# Patient Record
Sex: FEMALE | URBAN - METROPOLITAN AREA
[De-identification: names, ages, dates, MRNs, and addresses within clinical notes are randomized per-mention and may not be internally consistent; named-entity substitution may affect disease eponyms.]

---

## 2017-01-11 ENCOUNTER — VIRTUAL VISIT (OUTPATIENT)
Dept: FAMILY MEDICINE | Facility: OTHER | Age: 32
End: 2017-01-11

## 2017-01-11 NOTE — PROGRESS NOTES
"Date:   Clinician: Kortney Bairllas  Clinician NPI: [id number]  Patient: Rachel Fox  Patient : 1985  Patient Address: [address] [place] [address]  Patient Phone: [phone]  Visit Protocol: URI  Patient Summary:  Rachel is a 31 year old ( : 1985 ) female who initiated a Zip for a presumed sinus infection. When asked the question \"Do you have a [place] primary care physician?\", Rachel responded \"No\".     Her  symptoms started suddenly 8-9 days ago  and consist of post-nasal drainage, cough, myalgias, malaise, rhinitis, sore throat, hoarse voice, nasal congestion, and ear pain.   She denies fever, chest pain, dysphagia, nausea, vomiting, itchy eyes, chills, petechial or purpuric rash, loss of appetite, and dyspnea. She denies a history of facial surgery.   Her moderate nasal secretions are yellow, blood tinged, and clear. Her mild facial pain or pressure feels worse when bending over or leaning forward and is located on both sides of her head. The facial pain or pressure started after the onset of other URI symptoms.  Rachel has a moderate headache. The headache did not start before her other symptoms and is located on both sides of her head.   She has a mildly painful sore throat. The patient denies having white spots on the tonsils similar to a sample strep throat image provided. She might have been exposed to Strep. When asked to feel her neck she reported enlarged lymph nodes. Rachel noted that the enlarged neck lymph nodes developed AFTER the onset of upper respiratory symptoms. She denies axillary lymphadenopathy.   Regarding the ear pain, the patient denies experiencing pain when gently pulling on the earlobe, tinnitus, and recent injury to the area around the ear.   She reports having mild ear pain on the ear canal area of both ears for 8-10 days. The patient hears normally despite the ear pain.   Rachel reports having a feeling of fullness in the ear as " if it is clogged on the following locations: Ear Canal and Mastoid process.   Rachel denies having redness, swelling, and tenderness on her ear.   Additionally, she does not experience pain when bending the chin to the chest, finds the pain is worse while eating or chewing, and finds the pain worsens if the mouth is open fully or the teeth are clenched.   She has never had tympanostomy tube placement.    Her moderately severe (cough every 5-10 minutes) productive cough is more bothersome at night. She believes the cough is caused by post-nasal drainage. Her cough produces yellow sputum.   Her highest temperature was 99.0 degrees Fahrenheit. Her current temperature is 98.8 degrees Fahrenheit. Rachel has had a fever for 1 - 2 days and used the oral method for measuring her temperature.   She has passed urine in the past 12 hours.   aRchel denies having COPD or other chronic lung disease.   Pulse: self-reported pulse rate as: 9 beats in 10 seconds.   Current Temperature (F): 98.8     Weight (in lbs): 160   She states she is not pregnant and denies breastfeeding. She has menstruated in the past month.   Rachel smokes or uses smokeless tobacco.   MEDICATIONS:  Venlafaxine (Effexor), loratadine (Claritin), and fluticasone (Flonase)   Patient free text response:  Buproprion   , ALLERGIES:  NKDA   Clinician Response:  Dear Rachel,  Based on the information you have provided, you likely have  acute sinusitis, otherwise known as a sinus infection.   I am prescribing amoxicillin-Pot Clavulanate [Augmentin] 875-125mg. Take one tablet by mouth two times a day for 10 days. There are no refills with this prescription.   Sinus pressure occurs when the tissues lining your sinuses become swollen and inflamed. Afrin nasal spray decreases the swelling to provide the quickest and most effective relief from sinus pressure.  Use oxymetazoline (Afrin, or store brand) nasal spray. Spray once in each nostril twice per day for a  maximum of 3 days. Using this medication more frequently or longer than recommended may cause nasal congestion to reoccur or worsen. This is an over-the-counter medication you can find at most pharmacies.   Unless your are allergic to the over-the-counter medication(s) below, I recommend using:   Saline nasal spray (such as Ocean or store brand). Use 1-2 sprays in each nostril 3 times a day as needed for congestion. This is an over-the-counter medication that does not require a prescription.   Guaifenesin + dextromethorphan (Robitussin DM, Mucinex DM). This is an over-the-counter medication that does not require a prescription.   A decongestant such as pseudoephedrine (Sudafed or store brand) to help your symptoms. This is an over-the-counter medication that does not require a prescription.   Acetaminophen (Tylenol), which helps to reduce your discomfort and fever. Take 1-2 pills every 4-6 hours. This is an over-the-counter medication that does not require a prescription.   Ibuprofen. Take 1-3 200mg tablets (200-600mg) every 8 hours to help with the discomfort. Make sure to take the ibuprofen with food. Do not exceed 2400mg in 24 hours. This is an over-the-counter medication that does not require a prescription.   Try the following to help with your throat pain and discomfort:     Use throat lozenges    Gargle with warm salt water (1/4 teaspoon of salt per 8 ounce glass of water).    Suck on frozen items such as Popsicles or ice cubes.     Call 911 or go to the emergency room if you feel that your throat is closing off, you suddenly develop a rash, you are unable to swallow fluids, you are drooling, or you are having difficulty breathing.  Follow up with your primary care clinician if your symptoms are not improving in 3-4 days.   Mild ear pain or pressure is common when you have an upper respiratory infection. The pain is caused by fluid and inflammation in your sinus passages. If your ear pain persists more than 3  days or if you notice drainage from your ears, please be seen in a clinic to get your ears examined.   Drink plenty of liquids, especially water and take time to rest your body. This may mean taking a nap or going to bed earlier. Your body is fighting an infection and liquids and rest will improve the pace of recovery. Remember to regularly wash your hands and avoid close contact with others to prevent spreading your infection.   Some people develop allergies to antibiotics. If you notice a new rash, significant swelling or difficulty breathing, stop the medication immediately and go into a clinic for physical evaluation.   Some women may also develop a yeast infection as a side effect of taking antibiotics. If you notice symptoms of a yeast infection, please use Zipnosis to get treatment.   If you become pregnant during this course of treatment with medication, stop taking the medication and contact your primary care clinician.   Finally, as your clinician, I need you to know that becoming tobacco-free is the most important thing you can do to protect your current and future health.   Diagnosis: Acute Sinusitis  Diagnosis ICD: J01.90  Prescription: amoxicillin-Pot Clavulanate (Augmentin) 875-125 mg oral tablet 20 tablets, 10 days supply. Take one tablet by mouth two times a day for 10 days. Refills: 0, Refill as needed: no, Allow substitutions: yes  Prescription Sent At: January 11 04:22:56, 2017  Pharmacy: [facility]/pharmacy #[id number] - [phone] - [address] [place] [address]

## 2017-02-11 ENCOUNTER — VIRTUAL VISIT (OUTPATIENT)
Dept: FAMILY MEDICINE | Facility: OTHER | Age: 32
End: 2017-02-11

## 2017-02-11 NOTE — PROGRESS NOTES
Date:   Clinician: Flori Ramirez  Clinician NPI: [id number]  Patient: Rachel Fox  Patient : 1985  Patient Address: [address] [place] [address]  Patient Phone: [phone]  Visit Protocol: Yeast Infection  Patient Summary:  Rachel is a 31 year old ( : 1985 ) female who initiated a Zip for a presumed vaginal yeast infection.     0-5 days ago she began noticing vaginal discharge, vaginal pruritus, and perivulvar pruritus .   She denies having perivulvar rash, fever, and abdominal pain or lesions.  She has had one (1) occurrence in the past year and the current symptoms are similar to previous yeast infections. She has not tried to treat her current symptoms with any medication.   She has a more than normal amount of chunky (like cottage cheese), clear or white, thick, malodorous discharge.   She is currently taking or has taken antibiotics in the past 2 weeks. She prefers a fluconazole (Diflucan) Pill.   She states she is not pregnant and denies breastfeeding. She has menstruated in the past month.   She denies risk factors for sexually transmitted infections. She smokes or uses smokeless tobacco.  Additional information as reported by the patient (free text): I usually need 2 Diflucan doses instead of just 1.  Thanks!    MEDICATIONS:   Venlafaxine (Effexor)   Patient free text response: Buproprion    , ALLERGIES:  NKDA   Clinician Response:  Dear Rachel,  Based on the information you have provided, you likely have a vaginal yeast infection which is a common infection of the vagina caused by a fungus.   I am prescribing:   Fluconazole (Diflucan) 150 mg oral tablet to treat your yeast infection. Swallow one (1) tablet as a single dose. There are no refills with this prescription.   While you have yeast infection symptoms, do the following:      Avoid irritants such as scented bath products, tampons, pads, or vaginal sprays and powders.    Avoid douching.    Wear  cotton underwear and if you are comfortable doing so, do not wear underwear to bed.    Avoid hot tubs and whirlpool spas.     Most women notice improvement in their symptoms within 1-2 days after starting treatment with complete clearing in 5-7 days. If your symptoms have not improved in 3 days or not resolved in 10 days, please schedule an appointment to see your primary care clinician for an evaluation as your symptoms may be caused by an infection other than yeast. Sometimes yeast infections can be associated with other vaginal infections or with sexually transmitted infections (STIs). If you think you may be at risk for a STI please be seen in a clinic.   Finally, as your clinician, I need you to know that becoming tobacco-free is the most important thing you can do to protect your current and future health.   Diagnosis: Candida Vulvovaginitis  Diagnosis ICD: B37.3  Additional Clinician Notes: We are unable to prescribe 2 doses of diflucan on this website. Notify your primary care provider if that happens to be the case this time.  We do not give the second dose until day 3, so you should see if there is improvement of your symptoms  by then.  Prescription: fluconazole (Diflucan) 150mg oral tablet 1 tablet, 1 days supply. Take one tablet by mouth one time a day for 1 day. Refills: 0, Refill as needed: no, Allow substitutions: yes  Prescription Sent At: February 11 04:54:29, 2017  Pharmacy: [id number] - [phone] - [address] [place] [address]

## 2017-02-20 ENCOUNTER — VIRTUAL VISIT (OUTPATIENT)
Dept: FAMILY MEDICINE | Facility: OTHER | Age: 32
End: 2017-02-20

## 2017-02-20 NOTE — PROGRESS NOTES
Date:   Clinician: Ryley Hilario  Clinician NPI: [id number]  Patient: Rachel Fox  Patient : 1985  Patient Address: [address] [place] [address]  Patient Phone: [phone]  Visit Protocol: Yeast Infection  Patient Summary:  Rachel is a 31 year old ( : 1985 ) female who initiated a Zip for a presumed vaginal yeast infection.     5-10 days ago she began noticing vaginal discharge, vaginal pruritus, and perivulvar pruritus .   She denies having perivulvar rash, fever, and abdominal pain or lesions.  She has had one (1) occurrence in the past year and the current symptoms are similar to previous yeast infections. She has not tried to treat her current symptoms with any medication.   She has a more than normal amount of chunky (like cottage cheese), clear or white, thick, malodorous discharge.   She denies taking antibiotics in the past 2 weeks. She prefers a fluconazole (Diflucan) Pill.   She states she is not pregnant and denies breastfeeding. She has menstruated in the past month.   She denies risk factors for sexually transmitted infections. She smokes or uses smokeless tobacco.   MEDICATIONS:   Venlafaxine (Effexor)   , ALLERGIES:  NKDA   Clinician Response:  Dear Rachel,  Based on the information you have provided, you likely have a vaginal yeast infection which is a common infection of the vagina caused by a fungus.   I am prescribing:   Fluconazole (Diflucan) 150 mg oral tablet to treat your yeast infection. Swallow one (1) tablet as a single dose. There are no refills with this prescription.   While you have yeast infection symptoms, do the following:      Avoid irritants such as scented bath products, tampons, pads, or vaginal sprays and powders.    Avoid douching.    Wear cotton underwear and if you are comfortable doing so, do not wear underwear to bed.    Avoid hot tubs and whirlpool spas.     Most women notice improvement in their symptoms within 1-2 days  after starting treatment with complete clearing in 5-7 days. If your symptoms have not improved in 3 days or not resolved in 10 days, please schedule an appointment to see your primary care clinician for an evaluation as your symptoms may be caused by an infection other than yeast. Sometimes yeast infections can be associated with other vaginal infections or with sexually transmitted infections (STIs). If you think you may be at risk for a STI please be seen in a clinic.   Finally, as your clinician, I need you to know that becoming tobacco-free is the most important thing you can do to protect your current and future health.   Diagnosis: Candida Vulvovaginitis  Diagnosis ICD: B37.3  Prescription: fluconazole (Diflucan) 150mg oral tablet 1 tablet, 1 days supply. Take one tablet by mouth one time a day for 1 day. Refills: 0, Refill as needed: no, Allow substitutions: yes  Prescription Sent At: February 20 17:39:03, 2017  Pharmacy: [facility] [id number] [facility] - [phone] - [address], [place] [address]

## 2017-08-07 ENCOUNTER — VIRTUAL VISIT (OUTPATIENT)
Dept: FAMILY MEDICINE | Facility: OTHER | Age: 32
End: 2017-08-07

## 2017-08-07 NOTE — PROGRESS NOTES
"Date:   Clinician: Ryley Hilario  Clinician NPI: [id number]  Patient: Rachel Fox  Patient : 1985  Patient Address: [address] [place] [address]  Patient Phone: [phone]  Visit Protocol: Yeast infection  Patient Summary:  Rachel is a 31 year old ( : 1985 ) female who initiated a Visit for a presumed vaginal yeast infection. When asked the question \"Please sign me up to receive news, health information and promotions. \", Rachel responded \"No\".    5-10 days ago she began noticing vaginal pruritus and vaginal discharge .   She denies having perivulvar pruritus, abdominal pain, fever, and perivulvar rash or lesions.  She has had one (1) occurrence in the past year and the symptoms are NOT similar to previous yeast infections. She has not tried to treat her current symptoms with any medication.   She has a more than normal amount of smooth, clear or white, thin, malodorous discharge.   She denies taking antibiotics in the past 2 weeks. She prefers a fluconazole (Diflucan) Pill.   She states she is not pregnant and denies breastfeeding. She has menstruated in the past month.   She denies risk factors for sexually transmitted infections. She smokes or uses smokeless tobacco.  Additional information as reported by the patient (free text): I have had bacterial vaginosis before and the symptoms I currently have match the ones I had when I was diagnosed with BV.      MEDICATIONS:   Venlafaxine (Effexor)   Patient free text response: Buproprion   , ALLERGIES:  NKDA   Clinician Response:  Dear Rachel,  Based on the information you have provided, you likely have a vaginal yeast infection which is a common infection of the vagina caused by a fungus.   I am prescribing:   Fluconazole (Diflucan) 150 mg oral tablet to treat your yeast infection. Swallow one (1) tablet as a single dose. There are no refills with this prescription.   While you have yeast infection symptoms, do the " following:      Avoid irritants such as scented bath products, tampons, pads, or vaginal sprays and powders.    Avoid douching.    Wear cotton underwear and if you are comfortable doing so, do not wear underwear to bed.    Avoid hot tubs and whirlpool spas.     Most women notice improvement in their symptoms within 1-2 days after starting treatment with complete clearing in 5-7 days. If your symptoms have not improved in 3 days or not resolved in 10 days, please schedule an appointment to see your primary care provider for an evaluation as your symptoms may be caused by an infection other than yeast. Sometimes yeast infections can be associated with other vaginal infections or with sexually transmitted infections (STIs). If you think you may be at risk for a STI please be seen in a clinic.   Finally, as your provider, I need you to know that becoming tobacco-free is the most important thing you can do to protect your current and future health.   Diagnosis: Candida Vulvovaginitis  Diagnosis ICD: B37.3  Additional Clinician Notes: Sorry but there are no diagnosis for BV and treatment with this visit.  You can try Diflucan if you would like.  Prescription: fluconazole (Diflucan) 150mg oral tablet 1 tablet, 1 days supply. Take one tablet by mouth one time a day for 1 day. Refills: 0, Refill as needed: no, Allow substitutions: yes  Pharmacy: [id number] - [phone] - [address] [place] [address]

## 2017-09-10 ENCOUNTER — VIRTUAL VISIT (OUTPATIENT)
Dept: FAMILY MEDICINE | Facility: OTHER | Age: 32
End: 2017-09-10

## 2017-09-11 NOTE — PROGRESS NOTES
"Date:   Clinician: Althea Del Castillo  Clinician NPI: [id number]  Patient: Rachel Fox  Patient : 1985  Patient Address: [address] [place] [address]  Patient Phone: [phone]  Visit Protocol: UTI  Patient Summary:  Rachel is a 31 year old ( : 1985 ) female who initiated a Visit for a presumed bladder infection. When asked the question \"Please sign me up to receive news, health information and promotions. \", Rachel responded \"No\".    Her symptoms began 2 days ago and consist of dysuria, urinary incontinence, hesitation, urgency, and urinary frequency.   Symptom Details   Urinary Frequency: Several times each hour    She denies hematuria, recent antibiotic use, feeling feverish, abdominal pain, vaginal discharge, flank pain, vomiting, nausea, loss of appetite, chills, and foul smelling urine. She has not been hospitalized, been a patient in a nursing home, or had a catheter in the past two weeks. She denies risk factors for sexually transmitted infections.   She has a history of kidney stones. Her last episode was more than 6 months ago.   Rachel has had one (1) UTI in the past 12 months. Her most recent bladder infection was not within the last 4 weeks. Her current symptoms are similar to the previous UTI symptoms. She took TMP/Sulfa for her last infection and found it to be effective.    Rachel typically gets yeast infections when she takes antibiotics.  She denies pregnancy and denies breastfeeding. She has menstruated in the past month.   She smokes or uses smokeless tobacco.   MEDICATIONS:  Venlafaxine (Effexor)  , ALLERGIES:  NKDA   Clinician Response:  Dear Rachel,  Based on the information you have provided, you likely have a bladder infection, also called an acute urinary tract infection (UTI).   To treat your infection, I am prescribing:   Bactrim DS. Swallow one (1) tablet twice a day for 3 days to treat your bladder infection. Continue taking the " tablets even if you feel better before all the medication is gone. There is no refill with this prescription.   Antibiotic selections by the provider are based on safety and effectiveness. You may or may not be prescribed the same medication that you took for your last bladder infection.   Some people develop allergies to antibiotics. If you notice a new rash, significant swelling, or difficulty breathing, stop the medication immediately and go into a clinic for physical evaluation.   To help treat your current UTI and prevent future occurrences, remember to:     Drink 8-10, 8-ounce glasses of water daily.    Urinate after sexual intercourse.    Wipe front to back after using the bathroom.     Some women may develop a yeast infection as a side effect of taking antibiotics. Because you noted that you typically get yeast infections when you are taking antibiotics, I am also prescribing:   Fluconazole (Diflucan) 150 mg oral tablet. Take this medication only if you notice symptoms of a yeast infection (vaginal discharge that is white, thick, and odorless). There are no refills with this prescription.   You should visit a clinic for a follow-up visit if your symptoms do not improve in 1-2 days or if you experience another urinary tract infection soon after completing this treatment.  If you become pregnant during this course of treatment, stop taking the medication and contact your primary care provider.   Finally, as your provider, I need you to know that becoming tobacco-free is the most important thing you can do to protect your current and future health.   Diagnosis: Acute Uncomplicated Bladder Infection  Diagnosis ICD: N39.0  Prescription: sulfamethoxazole-TMP DS (Bactrim DS) 800-160mg oral tablet 6 tablets, 3 days supply. Take one tablet by mouth two times a day for 3 days. Refills: 0, Refill as needed: no, Allow substitutions: yes  Prescription: fluconazole (Diflucan) 150mg oral tablet 1 tablet, 1 days supply.  Take one tablet by mouth one time a day for 1 day. Refills: 0, Refill as needed: no, Allow substitutions: yes  [facility]: [id number] - [phone] - [address] [place] [address]

## 2017-12-01 ENCOUNTER — VIRTUAL VISIT (OUTPATIENT)
Dept: FAMILY MEDICINE | Facility: OTHER | Age: 32
End: 2017-12-01

## 2017-12-01 NOTE — PROGRESS NOTES
"Date:   Clinician: Vern Patiño  Clinician NPI: [id number]  Patient: Rachel Fox  Patient : 1985  Patient Address: [address] [place] [address]  Patient Phone: [phone]  Visit Protocol: UTI  Patient Summary:  Rachel is a 32 year old ( : 1985 ) female who initiated a Visit for a presumed bladder infection. When asked the question \"Please sign me up to receive news, health information and promotions. \", Rachel responded \"No\".    Her symptoms began 5 days ago and consist of dysuria, urinary frequency, foul smelling urine, hesitation, and urgency.   Symptom Details   Urinary Frequency: Several times each hour    She denies abdominal pain, vomiting, hematuria, urinary incontinence, loss of appetite, chills, vaginal discharge, flank pain, nausea, recent antibiotic use, and feeling feverish. She has not been hospitalized, been a patient in a nursing home, or had a catheter in the past two weeks. She denies risk factors for sexually transmitted infections.   She has a history of kidney stones. Her last episode was more than 6 months ago.   Rachel has had one (1) UTI in the past 12 months. Her most recent bladder infection was not within the last 4 weeks. Her current symptoms are similar to the previous UTI symptoms. She took ciprofloxacin for her last infection and found it to be effective.    Rachel typically gets yeast infections when she takes antibiotics.  She denies pregnancy and denies breastfeeding. She has menstruated in the past month.   She smokes or uses smokeless tobacco.   MEDICATIONS:  Venlafaxine (Effexor)   Patient free text response:  Buproprion  , ALLERGIES:  NKDA   Clinician Response:  Dear Rachel,  Based on the information you have provided, you likely have a bladder infection, also called an acute urinary tract infection (UTI).   To treat your infection, I am prescribing:   Bactrim DS. Swallow one (1) tablet twice a day for 3 days to treat " your bladder infection. Continue taking the tablets even if you feel better before all the medication is gone. There is no refill with this prescription.   Antibiotic selections by the provider are based on safety and effectiveness. You may or may not be prescribed the same medication that you took for your last bladder infection.   Some people develop allergies to antibiotics. If you notice a new rash, significant swelling, or difficulty breathing, stop the medication immediately and go into a clinic for physical evaluation.   To help treat your current UTI and prevent future occurrences, remember to:     Drink 8-10, 8-ounce glasses of water daily.    Urinate after sexual intercourse.    Wipe front to back after using the bathroom.     Some women may develop a yeast infection as a side effect of taking antibiotics. Because you noted that you typically get yeast infections when you are taking antibiotics, I am also prescribing:   Fluconazole (Diflucan) 150 mg oral tablet. Take this medication only if you notice symptoms of a yeast infection (vaginal discharge that is white, thick, and odorless). There are no refills with this prescription.   You should visit a clinic for a follow-up visit if your symptoms do not improve in 1-2 days or if you experience another urinary tract infection soon after completing this treatment.  If you become pregnant during this course of treatment, stop taking the medication and contact your primary care provider.   Finally, as your provider, I need you to know that becoming tobacco-free is the most important thing you can do to protect your current and future health.   Diagnosis: Acute Uncomplicated Bladder Infection  Diagnosis ICD: N39.0  Prescription: sulfamethoxazole-TMP DS (Bactrim DS) 800-160mg oral tablet 6 tablets, 3 days supply. Take one tablet by mouth two times a day for 3 days. Refills: 0, Refill as needed: no, Allow substitutions: yes  Prescription: fluconazole (Diflucan)  150mg oral tablet 1 tablet, 1 days supply. Take one tablet by mouth one time a day for 1 day. Refills: 0, Refill as needed: no, Allow substitutions: yes  Pharmacy: [id number] - [phone] - [address] [place] [address]

## 2017-12-19 ENCOUNTER — VIRTUAL VISIT (OUTPATIENT)
Dept: FAMILY MEDICINE | Facility: OTHER | Age: 32
End: 2017-12-19

## 2017-12-19 NOTE — PROGRESS NOTES
"Date:   Clinician: Ryan Barnes  Clinician NPI: [id number]  Patient: Rachel Fox  Patient : 1985  Patient Address: [address] [place] [address]  Patient Phone: [phone]  Visit Protocol: URI  Patient Summary:  Rachel is a 32 year old ( : 1985 ) female who initiated a Visit for cold, sinus infection, or influenza. When asked the question \"Please sign me up to receive news, health information and promotions. \", Rachel responded \"Yes\".    Rachel states her symptoms started gradually 10-13 days ago. After her symptoms started, they improved and then got worse again.   Her symptoms consist of myalgia, a headache, rhinitis, a sore throat, facial pain or pressure, malaise, ear pain, and a cough. Rachel also feels feverish.   Symptom Details     Nasal secretions: The color of her mucus is blood-tinged, green, and yellow.    Cough: Rachel coughs every 5-10 minutes and her cough is more bothersome at night. Phlegm comes into her throat when she coughs. She believes the phlegm causes the cough. The color of the phlegm is green and yellow.     Sore throat: Rachel reports having mild throat pain, does not have exudate on her tonsils, and is able to swallow liquids. The lymph nodes in her neck She is not sure if the lymph nodes in her neck are enlarged. She states that rashes have not appeared on the skin since the sore throat started.     Temperature: Her current temperature is 98.3 degrees Fahrenheit.     Facial pain or pressure: The facial pain or pressure feels worse when bending over or leaning forward.     Headache: She states the headache is mild.      Rachel denies having teeth pain, nasal congestion, wheezing, chills, and dyspnea. She also denies having a sinus infection within the past year, taking antibiotic medication for the symptoms, and having recent facial or sinus surgery in the past 60 days.   Within the past week, Rachel has not been exposed to " someone with strep throat. She has recently been exposed to someone with influenza. Rachel has not been in close contact with any high risk individuals.   Weight: 155 lbs   Rachel smokes or uses smokeless tobacco.   She denies pregnancy and denies breastfeeding. She is currently menstruating.   MEDICATIONS:  Venlafaxine (Effexor)   Patient free text response: Buproprion 150mg  , ALLERGIES:  NKDA   Clinician Response:  Dear Rachel,  I am sorry you are not feeling well. To determine the most appropriate care for you, I would like you to be seen in person to further discuss your health history and symptoms.  You will not be charged for this Visit. Thank you for trusting us with your care.   Diagnosis: Refer for additional evaluation  Diagnosis ICD: R69  Diagnosis ICD: 462.0

## 2018-02-22 ENCOUNTER — VIRTUAL VISIT (OUTPATIENT)
Dept: FAMILY MEDICINE | Facility: OTHER | Age: 33
End: 2018-02-22

## 2018-02-23 NOTE — PROGRESS NOTES
"Date:   Clinician: Vern Patiño  Clinician NPI: [id number]  Patient: Rachel Fox  Patient : 1985  Patient Address: [address] [place] [address]  Patient Phone: [phone]  Visit Protocol: Yeast infection  Patient Summary:  Rachel is a 32 year old ( : 1985 ) female who initiated a Visit for a presumed vaginal yeast infection. When asked the question \"Please sign me up to receive news, health information and promotions. \", Rachel responded \"No\".    0-5 days ago, she began noticing vaginal pruritus and vaginal discharge.   She denies having open sores, perivulvar rash, perivulvar pruritus, and abdominal pain. She also denies feeling feverish.   She has had one (1) occurrence in the past year and the current symptoms are similar to previous yeast infections. She has not tried to treat her current symptoms with any medication.   She has a more than normal amount of chunky (like cottage cheese), clear or white, thick, malodorous discharge.   She denies taking antibiotics in the past 2 weeks. She prefers a fluconazole (Diflucan) Pill.   She denies pregnancy and denies breastfeeding. She has menstruated in the past month.   She denies risk factors for sexually transmitted infections. She does NOT smoke or use smokeless tobacco.    MEDICATIONS:   Venlafaxine (Effexor)   Patient free text response: Buproprion    , ALLERGIES:  NKDA   Clinician Response:  Dear Rachel,  Based on the information you have provided, you likely have a vaginal yeast infection which is a common infection of the vagina caused by a fungus.  I am prescribing:   Fluconazole (Diflucan) 150 mg oral tablet to treat your yeast infection. Swallow one (1) tablet as a single dose. There are no refills with this prescription.   While you have yeast infection symptoms, do the following:      Avoid irritants such as scented bath products, tampons, pads, or vaginal sprays and powders.    Avoid douching.    Wear " cotton underwear and if you are comfortable doing so, do not wear underwear to bed.    Avoid hot tubs and whirlpool spas.     Most women notice improvement in their symptoms within 1-2 days after starting treatment with complete clearing in 5-7 days. If your symptoms have not improved in 3 days or not resolved in 10 days, please schedule an appointment to see your primary care provider for an evaluation as your symptoms may be caused by an infection other than yeast. Sometimes yeast infections can be associated with other vaginal infections or with sexually transmitted infections (STIs). If you think you may be at risk for a STI please be seen in a clinic.   Diagnosis: Candida Vulvovaginitis  Diagnosis ICD: B37.3  Prescription: fluconazole (Diflucan) 150mg oral tablet 1 tablet, 1 days supply. Take one tablet by mouth one time a day for 1 day. Refills: 0, Refill as needed: no, Allow substitutions: yes  Pharmacy: [id number] - [phone] - [address] [place] [address]

## 2018-04-19 ENCOUNTER — VIRTUAL VISIT (OUTPATIENT)
Dept: FAMILY MEDICINE | Facility: OTHER | Age: 33
End: 2018-04-19

## 2018-04-19 NOTE — PROGRESS NOTES
"Date:   Clinician: Cassy Pineda  Clinician NPI: [id number]  Patient: Rachel Fox  Patient : 1985  Patient Address: [address] [place] [address]  Patient Phone: [phone]  Visit Protocol: Yeast infection  Patient Summary:  Rachel is a 32 year old ( : 1985 ) female who initiated a Visit for a presumed vaginal yeast infection. When asked the question \"Please sign me up to receive news, health information and promotions. \", Rachel responded \"No\".    0-5 days ago, she began noticing vaginal pruritus and vaginal discharge.   She denies having open sores, perivulvar rash, perivulvar pruritus, and abdominal pain. She also denies feeling feverish.   She has had one (1) occurrence in the past year and the current symptoms are similar to previous yeast infections. She has not tried to treat her current symptoms with any medication.   She has a more than normal amount of chunky (like cottage cheese), clear or white, thick, malodorous discharge.   She denies taking antibiotics in the past 2 weeks. She prefers a fluconazole (Diflucan) Pill.   She denies pregnancy and denies breastfeeding. She has menstruated in the past month.   She denies risk factors for sexually transmitted infections. She does NOT smoke or use smokeless tobacco.   MEDICATIONS:  No current medications  , ALLERGIES:   NKDA    Clinician Response:  Dear Rachel,  Based on the information you have provided, you likely have a vaginal yeast infection which is a common infection of the vagina caused by a fungus.  I am prescribing:   Fluconazole (Diflucan) 150 mg oral tablet. Take 1 tablet by mouth 1 time per day for 1 day. There are no refills with this prescription.  While you have yeast infection symptoms, do the following:      Avoid irritants such as scented bath products, tampons, pads, or vaginal sprays and powders.    Avoid douching.    Wear cotton underwear and if you are comfortable doing so, do not " wear underwear to bed.    Avoid hot tubs and whirlpool spas.     Most women notice improvement in their symptoms within 1-2 days after starting treatment with complete clearing in 5-7 days. If your symptoms have not improved in 3 days or not resolved in 10 days, please schedule an appointment to see your primary care provider for an evaluation as your symptoms may be caused by an infection other than yeast. Sometimes yeast infections can be associated with other vaginal infections or with sexually transmitted infections (STIs). If you think you may be at risk for a STI please be seen in a clinic.   Diagnosis: Candida Vulvovaginitis  Diagnosis ICD: B37.3  Prescription: fluconazole (Diflucan) 150 mg oral tablet 1 1 tablet blister pack, 1 days supply. Take 1 tablet by mouth 1 time per day for 1 day. Refills: 0, Refill as needed: no, Allow substitutions: yes  Pharmacy: [id number] - [phone] - [address] [place] [address]

## 2018-06-10 ENCOUNTER — VIRTUAL VISIT (OUTPATIENT)
Dept: FAMILY MEDICINE | Facility: OTHER | Age: 33
End: 2018-06-10

## 2018-06-10 NOTE — PROGRESS NOTES
"Date:   Clinician: Kortney Jurado  Clinician NPI: [id number]  Patient: Rachel Fox  Patient : 1985  Patient Address: [address] [place] [address]  Patient Phone: [phone]  Visit Protocol: UTI  Patient Summary:  Rachel is a 32 year old ( : 1985 ) female who initiated a Visit for a presumed bladder infection. When asked the question \"Please sign me up to receive news, health information and promotions. \", Rachel responded \"No\".    Her symptoms began 2 days ago and consist of urinary frequency, dysuria, and hesitation.   Symptom Details   Urinary Frequency: Every hour    She denies flank pain, loss of appetite, feeling feverish, recent antibiotic use, chills, foul smelling urine, nausea, urgency, hematuria, urinary incontinence, vaginal discharge, abdominal pain, and vomiting. She has not been hospitalized, been a patient in a nursing home, or had a catheter in the past two weeks. She denies risk factors for sexually transmitted infections.   She has a history of kidney stones. Her last episode was more than 6 months ago.   Rachel has had one (1) UTI in the past 12 months. Her most recent bladder infection was not within the last 4 weeks. Her current symptoms are similar to the previous UTI symptoms. She took TMP/Sulfa for her last infection and found it to be effective.    Rachel typically gets yeast infections when she takes antibiotics.  She denies pregnancy and denies breastfeeding. She is currently menstruating.  MEDICATIONS: [], ALLERGIES: []  Clinician Response:  Dear Rachel,  Based on the information you have provided, you likely have a bladder infection, also called acute urinary tract infection (UTI).   To treat your infection, I am prescribing:   Sulfamethoxazole-trimethoprim (Bactrim DS) 800-160 mg oral tablet. Take 1 tablet by mouth every 12 hours for 3 days. Continue taking the tablets even if you feel better before all of the medication is " gone. There are no refills with this prescription.  Antibiotic selections by the provider are based on safety and effectiveness. You may or may not be prescribed the same medication that you took for your last bladder infection.   Some women may develop a yeast infection as a side effect of taking antibiotics. Because you noted that you typically get yeast infections when you are taking antibiotics, I am also prescribing:   Fluconazole (Diflucan) 150 mg oral tablet. Take 1 tablet by mouth 1 time a day for 1 day. Take this medication only if you notice symptoms of a yeast infection (vaginal discharge that is white, thick, and odorless). There are no refills with this prescription.  Some people develop allergies to antibiotics. If you notice a new rash, significant swelling, or difficulty breathing, stop the medication immediately and go into a clinic for physical evaluation.   If you become pregnant during this course of treatment, stop taking the medication and contact your primary care provider.   To help treat your current UTI and prevent future occurrences, remember to:     Drink 8-10, 8-ounce glasses of water daily.    Urinate after sexual intercourse.    Wipe front to back after using the bathroom.     You should visit a clinic for a follow-up visit if your symptoms do not improve in 1-2 days or if you experience another urinary tract infection soon after completing this treatment.   Diagnosis: Acute uncomplicated bladder infection  Diagnosis ICD: N39.0  Prescription: sulfamethoxazole-trimethoprim (Bactrim DS) 800-160 mg oral tablet 6 tablet, 3 days supply. Take 1 tablet by mouth every 12 hours for 3 days. Refills: 0, Refill as needed: no, Allow substitutions: yes  Prescription: fluconazole (Diflucan) 150 mg oral tablet 1 1 tablet blister pack, 1 days supply. Take 1 tablet by mouth 1 time a day for 1 day. Refills: 0, Refill as needed: no, Allow substitutions: yes  Pharmacy: [facility]  [phone] - [address] [place] [address]

## 2018-07-11 ENCOUNTER — VIRTUAL VISIT (OUTPATIENT)
Dept: FAMILY MEDICINE | Facility: OTHER | Age: 33
End: 2018-07-11

## 2018-07-12 NOTE — PROGRESS NOTES
"Date:   Clinician: Kortney Johnson  Clinician NPI: [id number]  Patient: Rachel Fox  Patient : 1985  Patient Address: [address] [place] [address]  Patient Phone: [phone]  Visit Protocol: Yeast infection  Patient Summary:  Rachel is a 32 year old ( : 1985 ) female who initiated a Visit for a presumed vaginal yeast infection. When asked the question \"Please sign me up to receive news, health information and promotions. \", Rachel responded \"Yes\".    0-5 days ago, she began noticing perivulvar pruritus, vaginal pruritus, and vaginal discharge.   She denies having open sores, perivulvar rash, and abdominal pain. She also denies feeling feverish.   Rachel has a history of vaginal yeast infections. She has had one (1) occurrence in the past year and the current symptoms are similar to previous yeast infections. She has used fluconazole (Diflucan) to treat previous yeast infections and typically needs 2 doses to resolve them.   She has not tried to treat her current symptoms with any medication.   She has a more than normal amount of chunky (like cottage cheese), clear or white, thick, non-odorous discharge.   She denies taking antibiotics in the past 2 weeks. She prefers a fluconazole (Diflucan) Pill.   She denies pregnancy and denies breastfeeding. She is currently menstruating.   She denies risk factors for sexually transmitted infections. She does NOT smoke or use smokeless tobacco.    MEDICATIONS: No current medications, ALLERGIES: NKDA  Clinician Response:  Dear Rachel,   Your health is our priority. To determine the most appropriate care for you, I would like you to be seen in person to further discuss your health history and symptoms.  You will not be charged for this Visit. Thank you for trusting us with your care.   Diagnosis: Refer for additional evaluation  Diagnosis ICD: R69  Additional Clinician Notes: You have already been referred out of " this platform today.  Please be seen in a clinic as advised.

## 2019-10-30 ENCOUNTER — VIRTUAL VISIT (OUTPATIENT)
Dept: FAMILY MEDICINE | Facility: OTHER | Age: 34
End: 2019-10-30

## 2019-10-30 NOTE — PROGRESS NOTES
"Date: 10/30/2019 04:37:04  Clinician: Kortney Jurado  Clinician NPI: [id number]  Patient: Rachel Fox  Patient : 1985  Patient Address: [address] [place] [address]  Patient Phone: [phone]  Visit Protocol: UTI  Patient Summary:  Rachel is a 33 year old ( : 1985 ) female who initiated a Visit for a presumed bladder infection. When asked the question \"Please sign me up to receive news, health information and promotions. \", Rachel responded \"No\".   Her symptoms started 1-3 days ago and consist of urinary frequency, dysuria, and feeling as if the bladder is never empty.   Symptom details   Urine color: The color of her urine is yellow.    Denied symptoms include chills, vaginal discharge, urinary urgency, urinary incontinence, vomiting, vaginal itching, foul-smelling urine, nausea, flank pain, and abdominal pain. She does not feel feverish.   Rachel has not used any over-the-counter medications or home remedies to relieve her current symptoms.  Precipitating events  Rachel denies having a sexually transmitted disease.  Pertinent medical history  Rachel has had a bladder infection before and has had 1 in the past 12 months. Her most recent bladder infection was not within the last 4 weeks. Her current symptoms are similar to her previous bladder infection symptoms.   She is not sure what antibiotics have been effective in treating her past bladder infections.   Rachel typically gets a yeast infection when she takes antibiotics. She has used fluconazole (Diflucan) to treat previous yeast infections. 1 dose of fluconazole (Diflucan) has typically been sufficient for symptoms to resolve in the past.   She has a history of kidney stones. Her last episode was more than 6 months ago.   Rachel has not been prescribed antibiotics to prevent frequent or repeated bladder infections in the past. She has not experienced problems or side effects with any of the common antibiotics " used to treat bladder infections.   She has not used a catheter or been a patient in a hospital or nursing home in the past 2 weeks.   Rachel smokes or uses smokeless tobacco.   She denies pregnancy and denies breastfeeding. She has menstruated in the past month.     MEDICATIONS: venlafaxine oral, bupropion HCl oral, ALLERGIES: NKDA  Clinician Response:  Dear Rachel,  Based on the information you have provided, you likely have an acute urinary tract infection, also called a bladder infection. Bladder infections occur when bacteria from the outside of the body enters the urinary tract. Any part of the urinary system can be infected, but the bladder is the most common.  Medication information  I am prescribing:     Nitrofurantoin monohyd/m-cryst (Macrobid) 100 mg oral capsule. Take 1 capsule by mouth every 12 hours for 5 days. Take this medication with food. There are no refills with this prescription.   The medication I prescribed for your bladder infection is an antibiotic. Continue taking the medication until it is gone even if you feel better.   Because you usually get a yeast infection when taking antibiotics, I am also prescribing:     Fluconazole (Diflucan) 150 mg oral tablet. Take 1 tablet by mouth 1 time a day for 1 day. There are no refills with this prescription.   Self care  Urination helps to flush bacteria from the urinary tract. For this reason, drinking water and urinating often helps relieve some urinary symptoms and can decrease your risk of getting bladder infections in the future.  Other steps you can take to prevent future bladder infections include:     Wipe front to back after using the bathroom    Urinate after sexual intercourse    Avoid using deodorant sprays, douches, or powders in the vaginal area     Becoming tobacco-free is one of the best things you can do to improve your health. For help with quitting tobacco, you can call 3-777-QUIT-NOW (1-553.279.9176) or visit smokefree.gov.   When to seek care  Please make an appointment to be seen in a clinic or urgent care if any of the following occur:     You develop new symptoms or your symptoms become worse    You have medication side effects that make it difficult to take them as prescribed    Your symptoms do not improve within 1-2 days of starting treatment    You have symptoms of a bladder infection that return shortly after completing treatment     It is possible to have an allergic reaction to an antibiotic even if you have not had one in the past. If you notice a new rash, significant swelling, or difficulty breathing, stop taking this medication immediately and go to a clinic or urgent care.   Diagnosis: Acute uncomplicated bladder infection  Diagnosis ICD: N39.0  Prescription: fluconazole (Diflucan) 150 mg oral tablet 1 1 tablet blister pack, 1 days supply. Take 1 tablet by mouth 1 time a day for 1 day. Refills: 0, Refill as needed: no, Allow substitutions: yes  Prescription: nitrofurantoin monohyd/m-cryst (Macrobid) 100 mg oral capsule 10 capsule, 5 days supply. Take 1 capsule by mouth every 12 hours for 5 days. Refills: 0, Refill as needed: no, Allow substitutions: yes  Pharmacy: [facility] - [phone] - [address] [place] [address]

## 2021-01-15 ENCOUNTER — HEALTH MAINTENANCE LETTER (OUTPATIENT)
Age: 36
End: 2021-01-15

## 2021-09-19 ENCOUNTER — HEALTH MAINTENANCE LETTER (OUTPATIENT)
Age: 36
End: 2021-09-19

## 2022-03-06 ENCOUNTER — HEALTH MAINTENANCE LETTER (OUTPATIENT)
Age: 37
End: 2022-03-06

## 2022-11-21 ENCOUNTER — HEALTH MAINTENANCE LETTER (OUTPATIENT)
Age: 37
End: 2022-11-21

## 2023-04-16 ENCOUNTER — HEALTH MAINTENANCE LETTER (OUTPATIENT)
Age: 38
End: 2023-04-16